# Patient Record
Sex: MALE | Race: WHITE | Employment: FULL TIME | URBAN - METROPOLITAN AREA
[De-identification: names, ages, dates, MRNs, and addresses within clinical notes are randomized per-mention and may not be internally consistent; named-entity substitution may affect disease eponyms.]

---

## 2017-06-01 ENCOUNTER — OFFICE VISIT (OUTPATIENT)
Dept: INTERNAL MEDICINE CLINIC | Age: 37
End: 2017-06-01

## 2017-06-01 VITALS
HEIGHT: 68 IN | BODY MASS INDEX: 22.28 KG/M2 | RESPIRATION RATE: 16 BRPM | SYSTOLIC BLOOD PRESSURE: 137 MMHG | TEMPERATURE: 97.5 F | OXYGEN SATURATION: 98 % | DIASTOLIC BLOOD PRESSURE: 89 MMHG | WEIGHT: 147 LBS | HEART RATE: 58 BPM

## 2017-06-01 DIAGNOSIS — K21.9 GASTROESOPHAGEAL REFLUX DISEASE, ESOPHAGITIS PRESENCE NOT SPECIFIED: ICD-10-CM

## 2017-06-01 DIAGNOSIS — R11.2 NON-INTRACTABLE VOMITING WITH NAUSEA, UNSPECIFIED VOMITING TYPE: Primary | ICD-10-CM

## 2017-06-01 PROCEDURE — 99204 OFFICE O/P NEW MOD 45 MIN: CPT | Performed by: NURSE PRACTITIONER

## 2017-06-01 RX ORDER — OMEPRAZOLE 20 MG/1
20 TABLET, DELAYED RELEASE ORAL DAILY
Qty: 30 TABLET | Refills: 3 | Status: CANCELLED | OUTPATIENT
Start: 2017-06-01

## 2017-06-01 RX ORDER — ONDANSETRON 4 MG/1
4 TABLET, FILM COATED ORAL EVERY 8 HOURS PRN
Qty: 30 TABLET | Refills: 1 | Status: SHIPPED | OUTPATIENT
Start: 2017-06-01 | End: 2017-11-01 | Stop reason: ALTCHOICE

## 2017-06-01 RX ORDER — OMEPRAZOLE 40 MG/1
40 CAPSULE, DELAYED RELEASE ORAL DAILY
Qty: 30 CAPSULE | Refills: 1 | Status: SHIPPED | OUTPATIENT
Start: 2017-06-01 | End: 2017-07-03 | Stop reason: SDUPTHER

## 2017-06-01 ASSESSMENT — ENCOUNTER SYMPTOMS: NAUSEA: 1

## 2017-07-03 ENCOUNTER — OFFICE VISIT (OUTPATIENT)
Dept: INTERNAL MEDICINE CLINIC | Age: 37
End: 2017-07-03

## 2017-07-03 VITALS
WEIGHT: 155 LBS | OXYGEN SATURATION: 99 % | HEART RATE: 69 BPM | DIASTOLIC BLOOD PRESSURE: 82 MMHG | BODY MASS INDEX: 23.49 KG/M2 | RESPIRATION RATE: 18 BRPM | HEIGHT: 68 IN | SYSTOLIC BLOOD PRESSURE: 129 MMHG

## 2017-07-03 DIAGNOSIS — K21.9 GASTROESOPHAGEAL REFLUX DISEASE, ESOPHAGITIS PRESENCE NOT SPECIFIED: ICD-10-CM

## 2017-07-03 DIAGNOSIS — G47.00 INSOMNIA, UNSPECIFIED TYPE: ICD-10-CM

## 2017-07-03 DIAGNOSIS — F41.9 ANXIETY DISORDER, UNSPECIFIED TYPE: Primary | ICD-10-CM

## 2017-07-03 PROCEDURE — 99214 OFFICE O/P EST MOD 30 MIN: CPT | Performed by: NURSE PRACTITIONER

## 2017-07-03 RX ORDER — OMEPRAZOLE 40 MG/1
40 CAPSULE, DELAYED RELEASE ORAL DAILY
Qty: 30 CAPSULE | Refills: 3 | Status: SHIPPED | OUTPATIENT
Start: 2017-07-03 | End: 2017-08-23 | Stop reason: SDUPTHER

## 2017-07-03 RX ORDER — LANOLIN ALCOHOL/MO/W.PET/CERES
3 CREAM (GRAM) TOPICAL NIGHTLY PRN
Qty: 30 TABLET | Refills: 1 | Status: SHIPPED | OUTPATIENT
Start: 2017-07-03 | End: 2018-10-18 | Stop reason: SDUPTHER

## 2017-07-03 RX ORDER — CITALOPRAM 10 MG/1
10 TABLET ORAL DAILY
Qty: 30 TABLET | Refills: 3 | Status: SHIPPED | OUTPATIENT
Start: 2017-07-03 | End: 2017-08-23 | Stop reason: SDUPTHER

## 2017-07-03 ASSESSMENT — PATIENT HEALTH QUESTIONNAIRE - PHQ9
2. FEELING DOWN, DEPRESSED OR HOPELESS: 1
SUM OF ALL RESPONSES TO PHQ QUESTIONS 1-9: 1
SUM OF ALL RESPONSES TO PHQ9 QUESTIONS 1 & 2: 1
1. LITTLE INTEREST OR PLEASURE IN DOING THINGS: 0

## 2017-07-06 PROBLEM — K21.9 GASTROESOPHAGEAL REFLUX DISEASE: Status: ACTIVE | Noted: 2017-07-06

## 2017-07-06 PROBLEM — G47.00 INSOMNIA: Status: ACTIVE | Noted: 2017-07-06

## 2017-07-06 PROBLEM — F41.9 ANXIETY DISORDER: Status: ACTIVE | Noted: 2017-07-06

## 2017-07-06 ASSESSMENT — ENCOUNTER SYMPTOMS
NAUSEA: 1
VOMITING: 0
COUGH: 0
CONSTIPATION: 0
ABDOMINAL PAIN: 0
ABDOMINAL DISTENTION: 0

## 2017-08-23 ENCOUNTER — OFFICE VISIT (OUTPATIENT)
Dept: INTERNAL MEDICINE CLINIC | Age: 37
End: 2017-08-23

## 2017-08-23 VITALS
DIASTOLIC BLOOD PRESSURE: 80 MMHG | OXYGEN SATURATION: 95 % | HEIGHT: 68 IN | BODY MASS INDEX: 22.43 KG/M2 | SYSTOLIC BLOOD PRESSURE: 127 MMHG | RESPIRATION RATE: 20 BRPM | HEART RATE: 83 BPM | WEIGHT: 148 LBS

## 2017-08-23 DIAGNOSIS — K21.9 GASTROESOPHAGEAL REFLUX DISEASE, ESOPHAGITIS PRESENCE NOT SPECIFIED: ICD-10-CM

## 2017-08-23 DIAGNOSIS — F41.9 ANXIETY DISORDER, UNSPECIFIED TYPE: ICD-10-CM

## 2017-08-23 PROCEDURE — 99213 OFFICE O/P EST LOW 20 MIN: CPT | Performed by: NURSE PRACTITIONER

## 2017-08-23 RX ORDER — CITALOPRAM 10 MG/1
10 TABLET ORAL DAILY
Qty: 30 TABLET | Refills: 3 | Status: SHIPPED | OUTPATIENT
Start: 2017-08-23 | End: 2017-09-20 | Stop reason: SDUPTHER

## 2017-08-23 RX ORDER — OMEPRAZOLE 40 MG/1
40 CAPSULE, DELAYED RELEASE ORAL DAILY
Qty: 30 CAPSULE | Refills: 3 | Status: SHIPPED | OUTPATIENT
Start: 2017-08-23 | End: 2019-02-04 | Stop reason: ALTCHOICE

## 2017-08-23 ASSESSMENT — ENCOUNTER SYMPTOMS
ABDOMINAL DISTENTION: 0
NAUSEA: 0
ABDOMINAL PAIN: 0
COUGH: 0

## 2017-09-20 ENCOUNTER — OFFICE VISIT (OUTPATIENT)
Dept: INTERNAL MEDICINE CLINIC | Age: 37
End: 2017-09-20

## 2017-09-20 VITALS
WEIGHT: 154 LBS | RESPIRATION RATE: 20 BRPM | OXYGEN SATURATION: 98 % | BODY MASS INDEX: 23.34 KG/M2 | HEART RATE: 60 BPM | DIASTOLIC BLOOD PRESSURE: 87 MMHG | HEIGHT: 68 IN | SYSTOLIC BLOOD PRESSURE: 137 MMHG

## 2017-09-20 DIAGNOSIS — F41.9 ANXIETY DISORDER, UNSPECIFIED TYPE: Primary | ICD-10-CM

## 2017-09-20 DIAGNOSIS — F32.9 REACTIVE DEPRESSION: ICD-10-CM

## 2017-09-20 PROCEDURE — 99213 OFFICE O/P EST LOW 20 MIN: CPT | Performed by: NURSE PRACTITIONER

## 2017-09-20 RX ORDER — CITALOPRAM 20 MG/1
20 TABLET ORAL DAILY
Qty: 30 TABLET | Refills: 3 | Status: SHIPPED | OUTPATIENT
Start: 2017-09-20 | End: 2018-02-08 | Stop reason: SDUPTHER

## 2017-09-20 RX ORDER — LORAZEPAM 0.5 MG/1
0.5 TABLET ORAL EVERY 8 HOURS PRN
Qty: 15 TABLET | Refills: 0 | Status: SHIPPED | OUTPATIENT
Start: 2017-09-20 | End: 2017-10-20

## 2017-09-20 ASSESSMENT — ENCOUNTER SYMPTOMS
VOMITING: 1
NAUSEA: 1
COUGH: 0

## 2017-11-01 ENCOUNTER — OFFICE VISIT (OUTPATIENT)
Dept: INTERNAL MEDICINE CLINIC | Age: 37
End: 2017-11-01

## 2017-11-01 VITALS
BODY MASS INDEX: 23.42 KG/M2 | HEART RATE: 76 BPM | DIASTOLIC BLOOD PRESSURE: 85 MMHG | RESPIRATION RATE: 16 BRPM | WEIGHT: 154 LBS | SYSTOLIC BLOOD PRESSURE: 135 MMHG

## 2017-11-01 DIAGNOSIS — F41.9 ANXIETY DISORDER, UNSPECIFIED TYPE: Primary | ICD-10-CM

## 2017-11-01 PROCEDURE — 99213 OFFICE O/P EST LOW 20 MIN: CPT | Performed by: NURSE PRACTITIONER

## 2017-11-01 NOTE — PATIENT INSTRUCTIONS
Patient Education        Anxiety Disorder: Care Instructions  Your Care Instructions  Anxiety is a normal reaction to stress. Difficult situations can cause you to have symptoms such as sweaty palms and a nervous feeling. In an anxiety disorder, the symptoms are far more severe. Constant worry, muscle tension, trouble sleeping, nausea and diarrhea, and other symptoms can make normal daily activities difficult or impossible. These symptoms may occur for no reason, and they can affect your work, school, or social life. Medicines, counseling, and self-care can all help. Follow-up care is a key part of your treatment and safety. Be sure to make and go to all appointments, and call your doctor if you are having problems. It's also a good idea to know your test results and keep a list of the medicines you take. How can you care for yourself at home? · Take medicines exactly as directed. Call your doctor if you think you are having a problem with your medicine. · Go to your counseling sessions and follow-up appointments. · Recognize and accept your anxiety. Then, when you are in a situation that makes you anxious, say to yourself, \"This is not an emergency. I feel uncomfortable, but I am not in danger. I can keep going even if I feel anxious. \"  · Be kind to your body:  ¨ Relieve tension with exercise or a massage. ¨ Get enough rest.  ¨ Avoid alcohol, caffeine, nicotine, and illegal drugs. They can increase your anxiety level and cause sleep problems. ¨ Learn and do relaxation techniques. See below for more about these techniques. · Engage your mind. Get out and do something you enjoy. Go to a funny movie, or take a walk or hike. Plan your day. Having too much or too little to do can make you anxious. · Keep a record of your symptoms. Discuss your fears with a good friend or family member, or join a support group for people with similar problems. Talking to others sometimes relieves stress.   · Get involved in play a relaxation tape while you drive a car. When should you call for help? Call 911 anytime you think you may need emergency care. For example, call if:  · You feel you cannot stop from hurting yourself or someone else. Keep the numbers for these national suicide hotlines: 5-614-766-TALK (7-229.525.7497) and 7-993-BPLJORJ (0-832.727.4628). If you or someone you know talks about suicide or feeling hopeless, get help right away. Watch closely for changes in your health, and be sure to contact your doctor if:  · You have anxiety or fear that affects your life. · You have symptoms of anxiety that are new or different from those you had before. Where can you learn more? Go to https://Interactive FatepePolaris Health Directionseb.CoCubes.com. org and sign in to your Solar Site Design account. Enter P754 in the StreetHawk box to learn more about \"Anxiety Disorder: Care Instructions. \"     If you do not have an account, please click on the \"Sign Up Now\" link. Current as of: July 26, 2016  Content Version: 11.3  © 4853-6649 [employer], [employer]orporated. Care instructions adapted under license by [facility] ([facility]). If you have questions about a medical condition or this instruction, always ask your healthcare professional. [address] any warranty or liability for your use of this information.

## 2017-11-02 ASSESSMENT — ENCOUNTER SYMPTOMS: CHOKING: 0

## 2017-11-03 ASSESSMENT — ENCOUNTER SYMPTOMS
VOMITING: 0
ABDOMINAL PAIN: 0
DIARRHEA: 0

## 2018-02-08 DIAGNOSIS — F41.9 ANXIETY DISORDER, UNSPECIFIED TYPE: ICD-10-CM

## 2018-02-08 DIAGNOSIS — F32.9 REACTIVE DEPRESSION: ICD-10-CM

## 2018-02-09 RX ORDER — CITALOPRAM 20 MG/1
20 TABLET ORAL DAILY
Qty: 30 TABLET | Refills: 0 | Status: SHIPPED | OUTPATIENT
Start: 2018-02-09 | End: 2018-02-26 | Stop reason: SDUPTHER

## 2018-02-26 ENCOUNTER — OFFICE VISIT (OUTPATIENT)
Dept: INTERNAL MEDICINE CLINIC | Age: 38
End: 2018-02-26

## 2018-02-26 VITALS
DIASTOLIC BLOOD PRESSURE: 88 MMHG | HEART RATE: 60 BPM | BODY MASS INDEX: 24.71 KG/M2 | WEIGHT: 163 LBS | RESPIRATION RATE: 17 BRPM | SYSTOLIC BLOOD PRESSURE: 134 MMHG | HEIGHT: 68 IN

## 2018-02-26 DIAGNOSIS — F41.9 ANXIETY DISORDER, UNSPECIFIED TYPE: Primary | ICD-10-CM

## 2018-02-26 DIAGNOSIS — F32.9 REACTIVE DEPRESSION: ICD-10-CM

## 2018-02-26 PROCEDURE — 99214 OFFICE O/P EST MOD 30 MIN: CPT | Performed by: NURSE PRACTITIONER

## 2018-02-26 RX ORDER — BUSPIRONE HYDROCHLORIDE 5 MG/1
5 TABLET ORAL 2 TIMES DAILY PRN
Qty: 40 TABLET | Refills: 0 | Status: SHIPPED | OUTPATIENT
Start: 2018-02-26 | End: 2018-07-02 | Stop reason: SDUPTHER

## 2018-02-26 RX ORDER — CITALOPRAM 20 MG/1
20 TABLET ORAL DAILY
Qty: 30 TABLET | Refills: 3 | Status: SHIPPED | OUTPATIENT
Start: 2018-02-26 | End: 2018-07-02 | Stop reason: SDUPTHER

## 2018-02-27 ASSESSMENT — ENCOUNTER SYMPTOMS
NAUSEA: 1
COUGH: 0

## 2018-02-27 NOTE — PROGRESS NOTES
2/27/18    Chief Complaint   Patient presents with    Anxiety    Depression       HPI:   Anthony Onofre is a 42 y.o. male is here today for follow up for anxiety and depression. Patient reports medication seems to be working for his depression however, in the past 2 weeks he has been feeling more anxious. States anxiety is the worst first thing in the morning and usually has nausea. Patient Active Problem List   Diagnosis    Anxiety disorder    Insomnia    Gastroesophageal reflux disease       Past Medical History:   Diagnosis Date    Anxiety        Family History   Problem Relation Age of Onset    Depression Mother        No Known Allergies    Current Outpatient Prescriptions   Medication Sig Dispense Refill    busPIRone (BUSPAR) 5 MG tablet Take 1 tablet by mouth 2 times daily as needed (for anxiety) 40 tablet 0    citalopram (CELEXA) 20 MG tablet Take 1 tablet by mouth daily 30 tablet 3    omeprazole (PRILOSEC) 40 MG delayed release capsule Take 1 capsule by mouth daily 30 capsule 3    melatonin (RA MELATONIN) 3 MG TABS tablet Take 1 tablet by mouth nightly as needed (insomnia) 30 tablet 1     No current facility-administered medications for this visit. Review of Systems   Constitutional: Negative for appetite change. HENT: Negative for congestion. Respiratory: Negative for cough. Gastrointestinal: Positive for nausea (only when feeling anxious). Neurological: Negative for headaches. Vitals:    02/26/18 1602   BP: 134/88   Pulse: 60   Resp: 17   Weight: 163 lb (73.9 kg)   Height: 5' 8\" (1.727 m)       BP Readings from Last 3 Encounters:   02/26/18 134/88   11/01/17 135/85   09/20/17 137/87        Lab Review   No visits with results within 2 Month(s) from this visit.    Latest known visit with results is:   Admission on 07/22/2016, Discharged on 07/22/2016   Component Date Value    WBC 07/22/2016 9.0     RBC 07/22/2016 5.40     Hemoglobin 07/22/2016 15.0     Hematocrit

## 2018-07-02 ENCOUNTER — OFFICE VISIT (OUTPATIENT)
Dept: INTERNAL MEDICINE CLINIC | Age: 38
End: 2018-07-02

## 2018-07-02 VITALS
RESPIRATION RATE: 18 BRPM | SYSTOLIC BLOOD PRESSURE: 138 MMHG | BODY MASS INDEX: 24.1 KG/M2 | WEIGHT: 159 LBS | DIASTOLIC BLOOD PRESSURE: 89 MMHG | HEIGHT: 68 IN | HEART RATE: 63 BPM | OXYGEN SATURATION: 99 %

## 2018-07-02 DIAGNOSIS — Z23 NEED FOR PROPHYLACTIC VACCINATION AGAINST DIPHTHERIA-TETANUS-PERTUSSIS (DTP): ICD-10-CM

## 2018-07-02 DIAGNOSIS — R19.7 DIARRHEA, UNSPECIFIED TYPE: ICD-10-CM

## 2018-07-02 DIAGNOSIS — R11.0 NAUSEATED: ICD-10-CM

## 2018-07-02 DIAGNOSIS — F41.1 GENERALIZED ANXIETY DISORDER: ICD-10-CM

## 2018-07-02 DIAGNOSIS — F32.9 REACTIVE DEPRESSION: Primary | ICD-10-CM

## 2018-07-02 PROCEDURE — 99214 OFFICE O/P EST MOD 30 MIN: CPT | Performed by: NURSE PRACTITIONER

## 2018-07-02 PROCEDURE — 90715 TDAP VACCINE 7 YRS/> IM: CPT | Performed by: NURSE PRACTITIONER

## 2018-07-02 PROCEDURE — 90471 IMMUNIZATION ADMIN: CPT | Performed by: NURSE PRACTITIONER

## 2018-07-02 RX ORDER — BUSPIRONE HYDROCHLORIDE 5 MG/1
5 TABLET ORAL 2 TIMES DAILY PRN
Qty: 60 TABLET | Refills: 1 | Status: SHIPPED | OUTPATIENT
Start: 2018-07-02 | End: 2018-10-02 | Stop reason: SDUPTHER

## 2018-07-02 RX ORDER — CITALOPRAM 20 MG/1
20 TABLET ORAL DAILY
Qty: 30 TABLET | Refills: 3 | Status: SHIPPED | OUTPATIENT
Start: 2018-07-02 | End: 2018-12-31 | Stop reason: SDUPTHER

## 2018-07-02 NOTE — PATIENT INSTRUCTIONS
Patient Education        Learning About Anxiety Disorders  What are anxiety disorders? Anxiety disorders are a type of medical problem. They cause severe anxiety. When you feel anxious, you feel that something bad is about to happen. This feeling interferes with your life. These disorders include:  · Generalized anxiety disorder. You feel worried and stressed about many everyday events and activities. This goes on for several months and disrupts your life on most days. · Panic disorder. You have repeated panic attacks. A panic attack is a sudden, intense fear or anxiety. It may make you feel short of breath. Your heart may pound. · Social anxiety disorder. You feel very anxious about what you will say or do in front of people. For example, you may be scared to talk or eat in public. This problem affects your daily life. · Phobias. You are very scared of a specific object, situation, or activity. For example, you may fear spiders, high places, or small spaces. What are the symptoms? Generalized anxiety disorder  Symptoms may include:  · Feeling worried and stressed about many things almost every day. · Feeling tired or irritable. You may have a hard time concentrating. · Having headaches or muscle aches. · Having a hard time getting to sleep or staying asleep. Panic disorder  You may have repeated panic attacks when there is no reason for feeling afraid. You may change your daily activities because you worry that you will have another attack. Symptoms may include:  · Intense fear, terror, or anxiety. · Trouble breathing or very fast breathing. · Chest pain or tightness. · A heartbeat that races or is not regular. Social anxiety disorder  Symptoms may include:  · Fear about a social situation, such as eating in front of others or speaking in public. You may worry a lot. Or you may be afraid that something bad will happen. · Anxiety that can cause you to blush, sweat, and feel shaky.   · A heartbeat that is faster than normal.  · A hard time focusing. Phobias  Symptoms may include:  · More fear than most people of being around an object, being in a situation, or doing an activity. You might also be stressed about the chance of being around the thing you fear. · Worry about losing control, panicking, fainting, or having physical symptoms like a faster heartbeat when you are around the situation or object. How are these disorders treated? Anxiety disorders can be treated with medicines or counseling. A combination of both may be used. Medicines may include:  · Antidepressants. These may help your symptoms by keeping chemicals in your brain in balance. · Benzodiazepines. These may give you short-term relief of your symptoms. Some people use cognitive-behavioral therapy. A therapist helps you learn to change stressful or bad thoughts into helpful thoughts. Lead a healthy lifestyle  A healthy lifestyle may help you feel better. · Get at least 30 minutes of exercise on most days of the week. Walking is a good choice. · Eat a healthy diet. Include fruits, vegetables, lean proteins, and whole grains in your diet each day. · Try to go to bed at the same time every night. Try for 8 hours of sleep a night. · Find ways to manage stress. Try relaxation exercises. · Avoid alcohol and illegal drugs. Follow-up care is a key part of your treatment and safety. Be sure to make and go to all appointments, and call your doctor if you are having problems. It's also a good idea to know your test results and keep a list of the medicines you take. Where can you learn more? Go to https://elian.OpenHatch. org and sign in to your Atrum [employer] account. Enter W615 in the State [facility] box to learn more about \"Learning About Anxiety Disorders. \"     If you do not have an account, please click on the \"Sign Up Now\" link.   Current as of: December 7, 2017  Content Version: 11.6  © 7095-6863 Healthwise, Incorporated. Care instructions adapted under license by [facility] ([facility]). If you have questions about a medical condition or this instruction, always ask your healthcare professional. [address] any warranty or liability for your use of this information.

## 2018-07-02 NOTE — PROGRESS NOTES
5.40     Hemoglobin 07/22/2016 15.0     Hematocrit 07/22/2016 45.2     MCV 07/22/2016 83.7     MCH 07/22/2016 27.7     MCHC 07/22/2016 33.1     RDW 07/22/2016 13.8     Platelets 29/85/4470 235     MPV 07/22/2016 7.5     Sodium 07/22/2016 141     Potassium 07/22/2016 3.7     Chloride 07/22/2016 100     CO2 07/22/2016 26     Anion Gap 07/22/2016 15     Glucose 07/22/2016 120*    BUN 07/22/2016 12     CREATININE 07/22/2016 0.8*    GFR Non- 07/22/2016 >60     GFR  07/22/2016 >60     Calcium 07/22/2016 9.4     Magnesium 07/22/2016 2.00     Total CK 07/22/2016 149        Physical Exam   HENT:   Mouth/Throat: Oropharynx is clear and moist.   Cardiovascular: Normal rate and regular rhythm. Pulmonary/Chest: Effort normal and breath sounds normal.   Abdominal: There is tenderness in the epigastric area. Musculoskeletal: Normal range of motion. Neurological: He is alert. Psychiatric: His speech is normal and behavior is normal. He exhibits a depressed mood. ASSESSMENT/PLAN:    Anum Jordan was seen today for depression. Diagnoses and all orders for this visit:    Reactive depression  -     citalopram (CELEXA) 20 MG tablet; Take 1 tablet by mouth daily    Generalized anxiety disorder  -     citalopram (CELEXA) 20 MG tablet; Take 1 tablet by mouth daily  -     busPIRone (BUSPAR) 5 MG tablet; Take 1 tablet by mouth 2 times daily as needed (for anxiety)    Need for prophylactic vaccination against diphtheria-tetanus-pertussis (DTP)  -     Tdap (age 10y-63y) IM (Adacel)    Nauseated    Diarrhea, unspecified type  -will refer to GI if continues to have increased problems with nausea and diarrhea. Patient to call if symptoms worsen, verbalized understanding.     RTC in 3 months or sooner if needed

## 2018-07-09 ASSESSMENT — ENCOUNTER SYMPTOMS
COUGH: 0
NAUSEA: 1
DIARRHEA: 1

## 2018-10-02 ENCOUNTER — OFFICE VISIT (OUTPATIENT)
Dept: INTERNAL MEDICINE CLINIC | Age: 38
End: 2018-10-02
Payer: COMMERCIAL

## 2018-10-02 VITALS
OXYGEN SATURATION: 98 % | SYSTOLIC BLOOD PRESSURE: 119 MMHG | RESPIRATION RATE: 20 BRPM | HEART RATE: 73 BPM | HEIGHT: 68 IN | WEIGHT: 167 LBS | DIASTOLIC BLOOD PRESSURE: 78 MMHG | BODY MASS INDEX: 25.31 KG/M2

## 2018-10-02 DIAGNOSIS — Z23 NEED FOR INFLUENZA VACCINATION: ICD-10-CM

## 2018-10-02 DIAGNOSIS — Z13.220 SCREENING FOR HYPERLIPIDEMIA: ICD-10-CM

## 2018-10-02 DIAGNOSIS — Z11.4 SCREENING FOR HIV (HUMAN IMMUNODEFICIENCY VIRUS): ICD-10-CM

## 2018-10-02 DIAGNOSIS — Z13.1 ENCOUNTER FOR SCREENING FOR DIABETES MELLITUS: ICD-10-CM

## 2018-10-02 DIAGNOSIS — F41.1 GENERALIZED ANXIETY DISORDER: Primary | ICD-10-CM

## 2018-10-02 PROCEDURE — 99214 OFFICE O/P EST MOD 30 MIN: CPT | Performed by: NURSE PRACTITIONER

## 2018-10-02 PROCEDURE — 90686 IIV4 VACC NO PRSV 0.5 ML IM: CPT | Performed by: NURSE PRACTITIONER

## 2018-10-02 PROCEDURE — 90471 IMMUNIZATION ADMIN: CPT | Performed by: NURSE PRACTITIONER

## 2018-10-02 RX ORDER — BUSPIRONE HYDROCHLORIDE 5 MG/1
5 TABLET ORAL 2 TIMES DAILY PRN
Qty: 60 TABLET | Refills: 3 | Status: SHIPPED | OUTPATIENT
Start: 2018-10-02 | End: 2019-02-04 | Stop reason: SDUPTHER

## 2018-10-02 NOTE — PROGRESS NOTES
10/10/18    Chief Complaint   Patient presents with    3 Month Follow-Up     Anxiety       HPI:   Roselyn Singh is a 34 y.o. male is here today for anxiety. Patient reports no longer having nausea in the mornings and improved appetite. Patient Active Problem List   Diagnosis    Anxiety disorder    Insomnia    Gastroesophageal reflux disease       Past Medical History:   Diagnosis Date    Anxiety        Family History   Problem Relation Age of Onset    Depression Mother        No Known Allergies    Current Outpatient Prescriptions   Medication Sig Dispense Refill    busPIRone (BUSPAR) 5 MG tablet Take 1 tablet by mouth 2 times daily as needed (for anxiety) 60 tablet 3    citalopram (CELEXA) 20 MG tablet Take 1 tablet by mouth daily 30 tablet 3    omeprazole (PRILOSEC) 40 MG delayed release capsule Take 1 capsule by mouth daily 30 capsule 3    melatonin (RA MELATONIN) 3 MG TABS tablet Take 1 tablet by mouth nightly as needed (insomnia) 30 tablet 1     No current facility-administered medications for this visit. Review of Systems   Psychiatric/Behavioral: Negative for decreased concentration. The patient is not nervous/anxious. All other systems reviewed and are negative. Vitals:    10/02/18 1606   BP: 119/78   Pulse: 73   Resp: 20   SpO2: 98%   Weight: 167 lb (75.8 kg)   Height: 5' 8\" (1.727 m)       BP Readings from Last 3 Encounters:   10/02/18 119/78   07/02/18 138/89   02/26/18 134/88        Lab Review   No visits with results within 2 Month(s) from this visit.    Latest known visit with results is:   Admission on 07/22/2016, Discharged on 07/22/2016   Component Date Value    WBC 07/22/2016 9.0     RBC 07/22/2016 5.40     Hemoglobin 07/22/2016 15.0     Hematocrit 07/22/2016 45.2     MCV 07/22/2016 83.7     MCH 07/22/2016 27.7     MCHC 07/22/2016 33.1     RDW 07/22/2016 13.8     Platelets 21/56/7239 235     MPV 07/22/2016 7.5     Sodium 07/22/2016 141     Potassium

## 2018-12-29 DIAGNOSIS — F32.9 REACTIVE DEPRESSION: ICD-10-CM

## 2018-12-29 DIAGNOSIS — F41.1 GENERALIZED ANXIETY DISORDER: ICD-10-CM

## 2018-12-29 DIAGNOSIS — F41.9 ANXIETY DISORDER, UNSPECIFIED TYPE: ICD-10-CM

## 2018-12-31 RX ORDER — CITALOPRAM 20 MG/1
20 TABLET ORAL DAILY
Qty: 30 TABLET | Refills: 3 | Status: SHIPPED | OUTPATIENT
Start: 2018-12-31 | End: 2019-02-04 | Stop reason: SDUPTHER

## 2018-12-31 RX ORDER — CITALOPRAM 20 MG/1
TABLET ORAL
Qty: 30 TABLET | Refills: 2 | OUTPATIENT
Start: 2018-12-31

## 2019-02-04 ENCOUNTER — OFFICE VISIT (OUTPATIENT)
Dept: INTERNAL MEDICINE CLINIC | Age: 39
End: 2019-02-04
Payer: COMMERCIAL

## 2019-02-04 VITALS
SYSTOLIC BLOOD PRESSURE: 107 MMHG | BODY MASS INDEX: 25.57 KG/M2 | HEART RATE: 67 BPM | OXYGEN SATURATION: 97 % | DIASTOLIC BLOOD PRESSURE: 71 MMHG | WEIGHT: 168.2 LBS | RESPIRATION RATE: 14 BRPM

## 2019-02-04 DIAGNOSIS — G47.00 INSOMNIA, UNSPECIFIED TYPE: ICD-10-CM

## 2019-02-04 DIAGNOSIS — F41.1 GENERALIZED ANXIETY DISORDER: ICD-10-CM

## 2019-02-04 DIAGNOSIS — F32.9 REACTIVE DEPRESSION: ICD-10-CM

## 2019-02-04 PROCEDURE — 99213 OFFICE O/P EST LOW 20 MIN: CPT | Performed by: NURSE PRACTITIONER

## 2019-02-04 RX ORDER — LANOLIN ALCOHOL/MO/W.PET/CERES
CREAM (GRAM) TOPICAL
Qty: 30 TABLET | Refills: 5 | Status: SHIPPED | OUTPATIENT
Start: 2019-02-04 | End: 2020-01-23

## 2019-02-04 RX ORDER — CITALOPRAM 20 MG/1
20 TABLET ORAL DAILY
Qty: 30 TABLET | Refills: 5 | Status: SHIPPED | OUTPATIENT
Start: 2019-02-04 | End: 2020-01-23

## 2019-02-04 RX ORDER — BUSPIRONE HYDROCHLORIDE 5 MG/1
5 TABLET ORAL 2 TIMES DAILY PRN
Qty: 60 TABLET | Refills: 5 | Status: SHIPPED | OUTPATIENT
Start: 2019-02-04 | End: 2022-06-09

## 2019-02-04 ASSESSMENT — ENCOUNTER SYMPTOMS
BACK PAIN: 0
COUGH: 0
SORE THROAT: 0
VOMITING: 0
DIARRHEA: 0
NAUSEA: 0
RHINORRHEA: 0

## 2019-08-06 ENCOUNTER — OFFICE VISIT (OUTPATIENT)
Dept: INTERNAL MEDICINE CLINIC | Age: 39
End: 2019-08-06
Payer: COMMERCIAL

## 2019-08-06 VITALS
SYSTOLIC BLOOD PRESSURE: 145 MMHG | BODY MASS INDEX: 25.24 KG/M2 | WEIGHT: 166 LBS | HEART RATE: 57 BPM | DIASTOLIC BLOOD PRESSURE: 88 MMHG | OXYGEN SATURATION: 97 % | RESPIRATION RATE: 18 BRPM

## 2019-08-06 DIAGNOSIS — G47.00 INSOMNIA, UNSPECIFIED TYPE: ICD-10-CM

## 2019-08-06 DIAGNOSIS — F41.1 GENERALIZED ANXIETY DISORDER: Primary | ICD-10-CM

## 2019-08-06 DIAGNOSIS — K21.9 GASTROESOPHAGEAL REFLUX DISEASE, ESOPHAGITIS PRESENCE NOT SPECIFIED: ICD-10-CM

## 2019-08-06 PROCEDURE — 99214 OFFICE O/P EST MOD 30 MIN: CPT | Performed by: NURSE PRACTITIONER

## 2019-08-06 NOTE — PROGRESS NOTES
[facility]  [address] [place] [address] Drive  [address] [address] [place]   Dept: [phone]       2019     Osorio Ivory (:  1980) is a 37 y.o. male, here for evaluation of the following medical concerns:    Patient is here for f/u for Anxiety, GERD and insomnia. Patient reports his anxiety continues to be under good control, has not needed to take buspar for a few months now. Patient also reports melatonin is working well for his insomnia. GERD  Paitent complains of heartburn. This has been associated with heartburn. He denies abdominal bloating, belching, chest pain, cough, dysphagia and hoarseness. Symptoms have been present for several months. He denies dysphagia. He has not lost weight. He denies melena, hematochezia, hematemesis, and coffee ground emesis. Medical therapy in the past has included proton pump inhibitors. Review of Systems   Constitutional: Negative for activity change and appetite change. HENT: Negative for congestion, rhinorrhea, sneezing and sore throat. Respiratory: Negative for cough. Cardiovascular: Negative for chest pain. Gastrointestinal: Negative for diarrhea, nausea and vomiting. Genitourinary: Negative for dysuria. Musculoskeletal: Negative for back pain. Neurological: Negative for headaches. Prior to Visit Medications    Medication Sig Taking? Authorizing Provider   citalopram (CELEXA) 20 MG tablet Take 1 tablet by mouth daily Yes INES Hill CNP   melatonin 3 MG TABS tablet TAKE ONE TABLET BY MOUTH ONCE NIGHTLY AS NEEDED FOR INSOMNIA Yes INES Kendrick CNP   busPIRone (BUSPAR) 5 MG tablet Take 1 tablet by mouth 2 times daily as needed (for anxiety)  Patient not taking: Reported on 2019  INES Kendrick CNP        Social History     Tobacco Use    Smoking status: Never Smoker    Smokeless tobacco: Never Used   Substance Use Topics    Alcohol use:  No

## 2019-08-06 NOTE — PATIENT INSTRUCTIONS
Patient Education        Learning About Generalized Anxiety Disorder  What is generalized anxiety disorder? We all worry. It's a normal part of life. But when you have generalized anxiety disorder, you worry about lots of things and have a hard time stopping your worry. This worry or anxiety interferes with your relationships, work, and life. What causes it? The cause is not known. But it may be passed down through families. What are the symptoms? You may feel anxious or worry most days about things like work, relationships, health, or money. You may worry about things that are unlikely to happen. You find it hard to stop or control the worry. Because you worry a lot and try hard to stop worrying, you may feel restless, tired, tense, or cranky. You may also find it hard to think or sleep. And you may have headaches or an upset stomach. How is it diagnosed? Your doctor will ask about your health and how often you worry or feel anxious. He or she may ask about other symptoms, like whether you:  · Feel restless. · Feel tired. · Have a hard time thinking or feel that your mind goes blank. · Feel cranky. · Have tense muscles. · Have sleep problems. A physical exam and tests can help make sure that your symptoms aren't caused by a different condition, such as a thyroid problem. How is it treated? Counseling and medicine can both work to treat anxiety. The two are often used along with lifestyle changes. Cognitive-behavioral therapy (CBT) is a type of counseling that's used to help treat anxiety. In CBT, you learn how to notice and replace thoughts that make you feel worried. It also can help you learn how to relax when you worry. Medicines can help. These medicines are often also used for depression. Selective serotonin reuptake inhibitors (SSRIs) are often tried first. But there are other medicines that your doctor may use. You may need to try a few medicines to find one that works well.   Many people feel better by getting regular exercise, eating healthy meals, and getting good sleep. Mindfulness--focusing on things that happen in the present moment--also can help reduce your anxiety. What can you expect when you have it? Having anxiety can be upsetting. Some people might feel less worried and stressed after a couple of months of treatment. But for other people, it might take longer to feel better. Reaching out to people for help is important. Try not to isolate yourself. Let your family and friends help you. Find someone you can trust and confide in. Talk to that person. When you know what anxiety is--and how you can get help for it--you can start to learn new ways of thinking. This can help you cope and work through your anxiety. Follow-up care is a key part of your treatment and safety. Be sure to make and go to all appointments, and call your doctor if you are having problems. It's also a good idea to know your test results and keep a list of the medicines you take. Where can you learn more? Go to https://Fulcrum BioenergypeCapevo.Lenddo. org and sign in to your ThermaSource account. Enter G110 in the Boomerang.com box to learn more about \"Learning About Generalized Anxiety Disorder. \"     If you do not have an account, please click on the \"Sign Up Now\" link. Current as of: September 11, 2018  Content Version: 12.0  © 1370-3997 Healthwise, Incorporated. Care instructions adapted under license by [facility] ([facility]). If you have questions about a medical condition or this instruction, always ask your healthcare professional. [place] [address] any warranty or liability for your use of this information. Patient Education        Insomnia: Care Instructions  Your Care Instructions    Insomnia is the inability to sleep well. It is a common problem for most people at some time. Insomnia may make it hard for you to get to sleep, stay asleep, or sleep as long as you need to.  This can sleepy. Do something relaxing. Repeat your bedtime routine before you go to bed again. · Make your house quiet and calm about an hour before bedtime. Turn down the lights, turn off the TV, log off the computer, and turn down the volume on music. This can help you relax after a busy day. When should you call for help? Watch closely for changes in your health, and be sure to contact your doctor if:    · Your efforts to improve your sleep do not work.     · Your insomnia gets worse.     · You have been feeling down, depressed, or hopeless or have lost interest in things that you usually enjoy. Where can you learn more? Go to https://Hunington PropertiespeMarcadia Biotech.Movinto Fun. org and sign in to your Wedding Spot account. Enter P513 in the [employer] box to learn more about \"Insomnia: Care Instructions. \"     If you do not have an account, please click on the \"Sign Up Now\" link. Current as of: June 28, 2018  Content Version: 12.0  © 1538-7514 Healthwise, Incorporated. Care instructions adapted under license by [facility] ([facility]). If you have questions about a medical condition or this instruction, always ask your healthcare professional. [place] [address] any warranty or liability for your use of this information.

## 2019-08-09 ASSESSMENT — ENCOUNTER SYMPTOMS
SORE THROAT: 0
COUGH: 0
RHINORRHEA: 0
VOMITING: 0
NAUSEA: 0
BACK PAIN: 0
DIARRHEA: 0

## 2020-01-23 ENCOUNTER — OFFICE VISIT (OUTPATIENT)
Dept: INTERNAL MEDICINE CLINIC | Age: 40
End: 2020-01-23
Payer: COMMERCIAL

## 2020-01-23 VITALS
SYSTOLIC BLOOD PRESSURE: 108 MMHG | OXYGEN SATURATION: 98 % | WEIGHT: 160 LBS | DIASTOLIC BLOOD PRESSURE: 65 MMHG | TEMPERATURE: 96.9 F | HEIGHT: 68 IN | RESPIRATION RATE: 14 BRPM | BODY MASS INDEX: 24.25 KG/M2 | HEART RATE: 63 BPM

## 2020-01-23 PROBLEM — M70.50 INFRAPATELLAR BURSITIS: Status: ACTIVE | Noted: 2020-01-23

## 2020-01-23 PROBLEM — F12.20 ADDICTION, MARIJUANA (HCC): Status: ACTIVE | Noted: 2020-01-23

## 2020-01-23 PROCEDURE — 90471 IMMUNIZATION ADMIN: CPT | Performed by: INTERNAL MEDICINE

## 2020-01-23 PROCEDURE — 90686 IIV4 VACC NO PRSV 0.5 ML IM: CPT | Performed by: INTERNAL MEDICINE

## 2020-01-23 PROCEDURE — 99215 OFFICE O/P EST HI 40 MIN: CPT | Performed by: INTERNAL MEDICINE

## 2020-01-23 RX ORDER — CITALOPRAM 20 MG/1
20 TABLET ORAL DAILY
Qty: 90 TABLET | Refills: 3 | Status: SHIPPED | OUTPATIENT
Start: 2020-01-23 | End: 2022-06-09 | Stop reason: SDUPTHER

## 2020-01-23 ASSESSMENT — ENCOUNTER SYMPTOMS
EYE REDNESS: 0
WHEEZING: 0
EYE PAIN: 0
STRIDOR: 0
BACK PAIN: 0
SHORTNESS OF BREATH: 0
ABDOMINAL PAIN: 0
BLOOD IN STOOL: 0
NAUSEA: 0
VOMITING: 0
SORE THROAT: 0
PHOTOPHOBIA: 0
DIARRHEA: 0
COUGH: 0
CONSTIPATION: 0
EYE DISCHARGE: 0

## 2020-01-23 ASSESSMENT — PATIENT HEALTH QUESTIONNAIRE - PHQ9
2. FEELING DOWN, DEPRESSED OR HOPELESS: 0
SUM OF ALL RESPONSES TO PHQ QUESTIONS 1-9: 0
SUM OF ALL RESPONSES TO PHQ QUESTIONS 1-9: 0
SUM OF ALL RESPONSES TO PHQ9 QUESTIONS 1 & 2: 0
1. LITTLE INTEREST OR PLEASURE IN DOING THINGS: 0

## 2020-01-23 NOTE — PROGRESS NOTES
Vaccine Information Sheet, \"Influenza - Inactivated\"  given to Luzma Yang, or parent/legal guardian of  Luzma Yang and verbalized understanding. Patient responses:    Have you ever had a reaction to a flu vaccine? No  Do you have any current illness? No  Have you ever had Guillian Anton Syndrome? No  Do you have a serious allergy to any of the follow: Neomycin, Polymyxin, Thimerosal, eggs or egg products? No    Flu vaccine given per order. Please see immunization tab. Risks and benefits explained. Current VIS given.

## 2020-01-23 NOTE — PATIENT INSTRUCTIONS
Patient Education        Bursitis: Care Instructions  Your Care Instructions    A bursa is a small sac of fluid that helps the tissues around a joint slide over one another easily. Injury or overuse of a joint can cause pain, redness, and inflammation in the bursa (bursitis). Bursitis usually gets better if you avoid the activity that caused it. You can help prevent bursitis from coming back by doing stretching and strengthening exercises. You may also need to change the way you do some activities. Follow-up care is a key part of your treatment and safety. Be sure to make and go to all appointments, and call your doctor if you are having problems. It's also a good idea to know your test results and keep a list of the medicines you take. How can you care for yourself at home? · Put ice or a cold pack on the area for 10 to 20 minutes at a time. Try to do this every 1 to 2 hours for the next 3 days (when you are awake) or until the swelling goes down. Put a thin cloth between the ice and your skin. · After the 3 days of using ice, you may use heat on the area. You can use a hot water bottle; a warm, moist towel; or a heating pad set on low. You can also try alternating heat and ice. · Rest the area where you have pain. Stop any activities that cause pain. Switch to activities that do not stress the area. · Take pain medicines exactly as directed. ? If the doctor gave you a prescription medicine for pain, take it as prescribed. ? If you are not taking a prescription pain medicine, ask your doctor if you can take an over-the-counter medicine. ? Do not take two or more pain medicines at the same time unless the doctor told you to. Many pain medicines have acetaminophen, which is Tylenol. Too much acetaminophen (Tylenol) can be harmful. · To prevent stiffness, gently move the joint as much as you can without pain every day. As the pain gets better, keep doing range-of-motion exercises.  Ask your doctor for exercises that will make the muscles around the joint stronger. Do these as directed. · You can slowly return to the activity that caused the pain, but do it with less effort until you can do it without pain or swelling. Be sure to warm up before and stretch after you do the activity. When should you call for help? Call your doctor now or seek immediate medical care if:    · You have new or worse symptoms of infection, such as:  ? Increased pain, swelling, warmth, or redness. ? Red streaks leading from the area. ? Pus draining from the area. ? A fever.    Watch closely for changes in your health, and be sure to contact your doctor if:    · You do not get better as expected. Where can you learn more? Go to https://Innoz.PhantomAlert.com.. org and sign in to your [employer] account. Enter N568 in the Monitor110 box to learn more about \"Bursitis: Care Instructions. \"     If you do not have an account, please click on the \"Sign Up Now\" link. Current as of: June 26, 2019  Content Version: 12.3  © 6269-5894 Healthwise, Incorporated. Care instructions adapted under license by [facility] ([facility]). If you have questions about a medical condition or this instruction, always ask your healthcare professional. [address] any warranty or liability for your use of this information.

## 2020-01-23 NOTE — PROGRESS NOTES
Chief Complaint   Patient presents with   [address] - No Concerns       HPI: Here to establish new PCP after CHARLENE [facility]gel [facility]ighb[facility] left practice here. REquests med refills. C/o swelling with little pain below left knee. Went to urgent care, where they removed a little fluid for testing, and put him on antibiotics because of some leg redness. That did not really help and the swelling is about the same. He works as a . Continues to smoke marijuana twice daily which is a longstanding habit for him. He does not see that as a problem. I have reviewed the chart notes available from myself and other providers. I have addressed all activeproblems listed below, and created or updated the problems list as needed. I have reviewed and updated the problems list in detail with patient. Patient Active Problem List   Diagnosis    Anxiety disorder    Insomnia    Gastroesophageal reflux disease    Infrapatellar bursitis    Addiction, marijuana ([address]       No problem-specific Assessment & Plan notes found for this encounter.       Discussed all labs, other tests, and imaging if available   Lab Results   Component Value Date    WBC 9.0 07/22/2016    HGB 15.0 07/22/2016    HCT 45.2 07/22/2016    MCV 83.7 07/22/2016     07/22/2016    RBC 5.40 07/22/2016    MCH 27.7 07/22/2016    MCHC 33.1 07/22/2016    RDW 13.8 07/22/2016     Lab Results   Component Value Date     07/22/2016    K 3.7 07/22/2016     07/22/2016    CO2 26 07/22/2016    BUN 12 07/22/2016    CREATININE 0.8 (L) 07/22/2016    GLUCOSE 120 (H) 07/22/2016    CALCIUM 9.4 07/22/2016    LABGLOM >60 07/22/2016    GFRAA >60 07/22/2016     No results found for: TRIG  No results found for: HDL  No results found for: LDLCALC, LDLCHOLESTEROL  No results found for: PSA, PSADIA  No results found for: TSHFT4, TSH  No results found for: LABA1C  No results found for: EAG    I have extensively reviewed and reconciled the medication Not on file     Emotionally abused: Not on file     Physically abused: Not on file     Forced sexual activity: Not on file   Other Topics Concern    Not on file   Social History Narrative    Not on file       Family History   Problem Relation Age of Onset    Depression Mother          Health Maintenance Due   Topic Date Due    HIV screen  05/22/1993    Lipid screen  05/22/2018         /65 (Site: Right Upper Arm, Position: Sitting, Cuff Size: Medium Adult)   Pulse 63   Temp 96.9 °F (36.1 °C) (Oral)   Resp 14   Ht 5' 8\" (1.727 m)   Wt 160 lb (72.6 kg)   SpO2 98%   BMI 24.33 kg/m²   Body mass index is 24.33 kg/m². Physical Exam  Constitutional:       General: He is not in acute distress. Appearance: He is not diaphoretic. HENT:      Head: Normocephalic and atraumatic. Nose: Nose normal.      Mouth/Throat:      Mouth: Mucous membranes are moist.      Pharynx: No oropharyngeal exudate. Eyes:      General: No scleral icterus. Right eye: No discharge. Left eye: No discharge. Conjunctiva/sclera: Conjunctivae normal.      Pupils: Pupils are equal, round, and reactive to light. Neck:      Musculoskeletal: Normal range of motion and neck supple. Thyroid: No thyromegaly. Vascular: No JVD. Trachea: No tracheal deviation. Cardiovascular:      Rate and Rhythm: Normal rate and regular rhythm. Heart sounds: Normal heart sounds. No murmur. No friction rub. No gallop. Pulmonary:      Effort: Pulmonary effort is normal. No respiratory distress. Breath sounds: Normal breath sounds. No stridor. No wheezing. Chest:      Chest wall: No tenderness. Abdominal:      General: Bowel sounds are normal. There is no distension. Palpations: Abdomen is soft. There is no mass. Tenderness: There is no tenderness. There is no guarding or rebound. Musculoskeletal: Normal range of motion. General: No tenderness.       Comments: 5 x 6 cm boggy slightly warm non-erythematous swelling inferior to the left patella; has a port wine stain birthmark that extends over part of the swelling but no other erythema or streaks. Tolerates palpation and full range of motion   Lymphadenopathy:      Cervical: No cervical adenopathy. Skin:     General: Skin is warm and dry. Capillary Refill: Capillary refill takes less than 2 seconds. Coloration: Skin is not pale. Findings: No erythema or rash. Neurological:      Mental Status: He is alert and oriented to person, place, and time. Cranial Nerves: No cranial nerve deficit. Motor: No abnormal muscle tone. Coordination: Coordination normal.      Deep Tendon Reflexes: Reflexes are normal and symmetric. Reflexes normal.   Psychiatric:         Mood and Affect: Mood normal.         Behavior: Behavior normal.         Thought Content: Thought content normal.         Judgment: Judgment normal.         ASSESSMENT AND PLANS:      Except as noted below, all chronic problems have been reviewed and are stable to continue medications or other therapy as previously documented in the patient's chart, with changes per orders or documentation below:        Assessment and Plan: Patient received counseling and, if relevant,printed instructions for all symptoms listed in CC and HPI, as well as for all diagnoses brought onto today's visit note below. Typical counseling includes, but is not limited to, non pharmacologic measures to manage listed symptoms and conditions; appropriate use, risks and benefits for all prescribed medications; potential interactions between medications both prescribed and OTC; diet; exercise; healthy behaviors; and goalsetting to improve health. Pt.or responsible party was involved in shared decision making and had opportunity to have all questions answered. 1. Establishing care with new doctor, encounter for    2.  Generalized anxiety disorder  - citalopram (CELEXA) 20 MG tablet;

## 2020-01-23 NOTE — PROGRESS NOTES
Review of Systems   Constitutional: Negative for chills, fatigue, fever and unexpected weight change. HENT: Negative for congestion, ear discharge, ear pain, hearing loss, nosebleeds and sore throat. Eyes: Negative for photophobia, pain, discharge, redness and visual disturbance. Respiratory: Negative for cough, shortness of breath, wheezing and stridor. Cardiovascular: Negative for chest pain, palpitations and leg swelling. Gastrointestinal: Negative for abdominal pain, blood in stool, constipation, diarrhea, nausea and vomiting. Genitourinary: Negative for dysuria, flank pain, frequency, hematuria and urgency. Musculoskeletal: Positive for joint swelling (knee swollen, has fluid on it). Negative for back pain, myalgias and neck pain. Skin: Negative for rash. Neurological: Negative for dizziness, tremors, seizures, syncope, weakness and headaches. Hematological: Does not bruise/bleed easily. Psychiatric/Behavioral: Negative for hallucinations. The patient is not nervous/anxious.

## 2020-04-11 ENCOUNTER — HOSPITAL ENCOUNTER (EMERGENCY)
Age: 40
Discharge: HOME | End: 2020-04-11
Attending: EMERGENCY MEDICINE
Payer: COMMERCIAL

## 2020-04-11 VITALS
BODY MASS INDEX: 25.16 KG/M2 | WEIGHT: 166.01 LBS | TEMPERATURE: 98.3 F | RESPIRATION RATE: 16 BRPM | DIASTOLIC BLOOD PRESSURE: 85 MMHG | OXYGEN SATURATION: 98 % | HEIGHT: 68 IN | SYSTOLIC BLOOD PRESSURE: 135 MMHG | HEART RATE: 79 BPM

## 2020-04-11 PROCEDURE — 6370000000 HC RX 637 (ALT 250 FOR IP): Performed by: EMERGENCY MEDICINE

## 2020-04-11 PROCEDURE — 99283 EMERGENCY DEPT VISIT LOW MDM: CPT

## 2020-04-11 RX ORDER — TETRACAINE HYDROCHLORIDE 5 MG/ML
1 SOLUTION OPHTHALMIC ONCE
Status: COMPLETED | OUTPATIENT
Start: 2020-04-11 | End: 2020-04-11

## 2020-04-11 RX ORDER — CIPROFLOXACIN HYDROCHLORIDE 3.5 MG/ML
1 SOLUTION/ DROPS TOPICAL ONCE
Status: COMPLETED | OUTPATIENT
Start: 2020-04-11 | End: 2020-04-11

## 2020-04-11 RX ADMIN — TETRACAINE HYDROCHLORIDE 1 DROP: 5 SOLUTION OPHTHALMIC at 10:13

## 2020-04-11 RX ADMIN — CIPROFLOXACIN 1 DROP: 3 SOLUTION OPHTHALMIC at 10:51

## 2020-04-11 ASSESSMENT — ENCOUNTER SYMPTOMS
STRIDOR: 0
ABDOMINAL DISTENTION: 0
CHOKING: 0
COLOR CHANGE: 0
CHEST TIGHTNESS: 0
CONSTIPATION: 0
APNEA: 0
ANAL BLEEDING: 0
BLOOD IN STOOL: 0
WHEEZING: 0
RECTAL PAIN: 0
SHORTNESS OF BREATH: 0
NAUSEA: 0
BACK PAIN: 0
EYE PAIN: 1
VOMITING: 0
PHOTOPHOBIA: 1
COUGH: 0
EYE DISCHARGE: 1
DIARRHEA: 0
ABDOMINAL PAIN: 0
EYE REDNESS: 1

## 2020-04-11 ASSESSMENT — PAIN DESCRIPTION - DESCRIPTORS: DESCRIPTORS: PRESSURE

## 2020-04-11 ASSESSMENT — PAIN - FUNCTIONAL ASSESSMENT
PAIN_FUNCTIONAL_ASSESSMENT: ACTIVITIES ARE NOT PREVENTED
PAIN_FUNCTIONAL_ASSESSMENT: 0-10

## 2020-04-11 ASSESSMENT — PAIN DESCRIPTION - LOCATION: LOCATION: EYE

## 2020-04-11 ASSESSMENT — PAIN DESCRIPTION - FREQUENCY: FREQUENCY: CONTINUOUS

## 2020-04-11 ASSESSMENT — PAIN DESCRIPTION - ONSET: ONSET: GRADUAL

## 2020-04-11 ASSESSMENT — PAIN SCALES - GENERAL
PAINLEVEL_OUTOF10: 2
PAINLEVEL_OUTOF10: 5

## 2020-04-11 ASSESSMENT — PAIN DESCRIPTION - PROGRESSION: CLINICAL_PROGRESSION: GRADUALLY WORSENING

## 2020-04-11 ASSESSMENT — PAIN DESCRIPTION - ORIENTATION: ORIENTATION: LEFT

## 2020-04-11 ASSESSMENT — PAIN DESCRIPTION - PAIN TYPE: TYPE: ACUTE PAIN

## 2020-04-11 NOTE — ED NOTES
Pt's Morgans Lens irrigation dc'd by Daniel Schmitt per EMD order as pt has f/u appointment at [facility] @ 1200 hrs/     Deisi Moeller RN  04/11/20 1100

## 2020-04-11 NOTE — ED PROVIDER NOTES
systems was reviewed and negative. PAST MEDICAL HISTORY     Past Medical History:   Diagnosis Date    Anxiety     Depression        SURGICAL HISTORY     History reviewed. No pertinent surgical history. CURRENT MEDICATIONS       Previous Medications    BUSPIRONE (BUSPAR) 5 MG TABLET    Take 1 tablet by mouth 2 times daily as needed (for anxiety)    CITALOPRAM (CELEXA) 20 MG TABLET    Take 1 tablet by mouth daily       ALLERGIES     Patient has no known allergies.     FAMILY HISTORY        Family History   Problem Relation Age of Onset    Depression Mother        SOCIAL HISTORY       Social History     Socioeconomic History    Marital status: Single     Spouse name: None    Number of children: None    Years of education: None    Highest education level: None   Occupational History    None   Social Needs    Financial resource strain: None    Food insecurity     Worry: None     Inability: None    Transportation needs     Medical: None     Non-medical: None   Tobacco Use    Smoking status: Never Smoker    Smokeless tobacco: Never Used   Substance and Sexual Activity    Alcohol use: No    Drug use: Yes     Types: Marijuana     Comment: occasionally    Sexual activity: Yes     Partners: Female   Lifestyle    Physical activity     Days per week: None     Minutes per session: None    Stress: None   Relationships    Social connections     Talks on phone: None     Gets together: None     Attends Druze service: None     Active member of club or organization: None     Attends meetings of clubs or organizations: None     Relationship status: None    Intimate partner violence     Fear of current or ex partner: None     Emotionally abused: None     Physically abused: None     Forced sexual activity: None   Other Topics Concern    None   Social History Narrative    None       PHYSICAL EXAM       ED Triage Vitals [04/11/20 1006]   BP Temp Temp Source Pulse Resp SpO2 Height Weight   135/85 98.3 °F (36.8

## 2020-04-11 NOTE — ED NOTES
Discharge and education instructions reviewed. Patient verbalized understanding, teach-back successful. Patient denied questions at this time. No acute distress noted. Patient instructed to follow-up as noted - return to emergency department if symptoms worsen. Patient verbalized understanding. Discharged per EDMD with discharge instructions.          Sara Lockett RN  04/11/20 6345

## 2020-05-12 ENCOUNTER — TELEPHONE (OUTPATIENT)
Dept: INTERNAL MEDICINE CLINIC | Age: 40
End: 2020-05-12

## 2020-05-19 ENCOUNTER — VIRTUAL VISIT (OUTPATIENT)
Dept: INTERNAL MEDICINE CLINIC | Age: 40
End: 2020-05-19
Payer: COMMERCIAL

## 2020-05-19 VITALS — BODY MASS INDEX: 24.25 KG/M2 | HEIGHT: 68 IN | WEIGHT: 160 LBS

## 2020-05-19 PROCEDURE — 99213 OFFICE O/P EST LOW 20 MIN: CPT | Performed by: INTERNAL MEDICINE

## 2020-05-19 RX ORDER — OLOPATADINE HYDROCHLORIDE 1 MG/ML
SOLUTION/ DROPS OPHTHALMIC
COMMUNITY
Start: 2020-04-08 | End: 2022-06-09

## 2020-05-19 RX ORDER — ERYTHROMYCIN 5 MG/G
OINTMENT OPHTHALMIC
COMMUNITY
Start: 2020-04-13 | End: 2022-06-09

## 2020-05-19 RX ORDER — FLUTICASONE PROPIONATE 50 MCG
SPRAY, SUSPENSION (ML) NASAL
COMMUNITY
Start: 2020-04-08 | End: 2022-06-09

## 2020-05-19 NOTE — PROGRESS NOTES
changes--likewise for the intermittent visual blurring, he requires optometry follow-up. I advised him that if his is severe or if he loses part of his visual field that would be emergent. Problem List Items Addressed This Visit     None      Visit Diagnoses     Corneal irritation of left eye    -  Primary    Vision changes                Return if symptoms worsen or fail to improve. Nancy Watson is a 37 y.o. male being evaluated by a Virtual Visit (video visit) encounter to address concerns as mentioned above. A caregiver was present when appropriate. Due to this being a TeleHealth encounter ([facility] health [facility]), evaluation of the following organ systems was limited: Vitals/Constitutional/EENT/Resp/CV/GI//MS/Neuro/Skin/Heme-Lymph-Imm. Pursuant to the emergency declaration under the [address] [place] [address], [address] [place] [address] authority and the [address] and [employer] Act, this Virtual Visit was conducted with patient's (and/or legal guardian's) consent, to reduce the patient's risk of exposure to COVID-19 and provide necessary medical care. The patient (and/or legal guardian) has also been advised to contact this office for worsening conditions or problems, and seek emergency medical treatment and/or call 911 if deemed necessary. Patient identification was verified at the start of the visit: Yes    Total time spent for this encounter: Not billed by time    Services were provided through a video synchronous discussion virtually to substitute for in-person clinic visit. Patient and provider were located at their individual homes. --Donato Reveles MD on 5/19/2020 at 2:41 PM    An electronic signature was used to authenticate this note.

## 2022-05-05 ENCOUNTER — OFFICE VISIT (OUTPATIENT)
Dept: INTERNAL MEDICINE CLINIC | Age: 42
End: 2022-05-05
Payer: COMMERCIAL

## 2022-05-05 VITALS
BODY MASS INDEX: 25.76 KG/M2 | HEART RATE: 68 BPM | HEIGHT: 68 IN | SYSTOLIC BLOOD PRESSURE: 137 MMHG | OXYGEN SATURATION: 99 % | DIASTOLIC BLOOD PRESSURE: 88 MMHG | WEIGHT: 170 LBS

## 2022-05-05 DIAGNOSIS — L57.0 ACTINIC KERATOSES: ICD-10-CM

## 2022-05-05 DIAGNOSIS — L81.9 CHANGE IN MULTIPLE PIGMENTED SKIN LESIONS: Primary | ICD-10-CM

## 2022-05-05 PROCEDURE — 99213 OFFICE O/P EST LOW 20 MIN: CPT | Performed by: INTERNAL MEDICINE

## 2022-05-05 ASSESSMENT — PATIENT HEALTH QUESTIONNAIRE - PHQ9
1. LITTLE INTEREST OR PLEASURE IN DOING THINGS: 0
SUM OF ALL RESPONSES TO PHQ9 QUESTIONS 1 & 2: 0
SUM OF ALL RESPONSES TO PHQ QUESTIONS 1-9: 0
2. FEELING DOWN, DEPRESSED OR HOPELESS: 0
SUM OF ALL RESPONSES TO PHQ QUESTIONS 1-9: 0

## 2022-05-05 NOTE — PROGRESS NOTES
Chief Complaint   Patient presents with    Skin Problem     lump on neck and ear       HPI: Complains of a lesion on right posterior neck that he has been aware of for a couple of months and thinks is increasing in size. He states that has itched off and on, which causes him to pick at it. He also notes a similar lesion in the fold behind the right ear that has been present for years and has not been changing. Currently, he does not get much sun exposure, but he admits to sunburns in his youth. Medications reviewed and reconciled with what patient reports to be taking. /88   Pulse 68   Ht 5' 8\" (1.727 m)   Wt 170 lb (77.1 kg)   SpO2 99%   BMI 25.85 kg/m²     Physical Exam well appearing  Heavily sun damaged skin including actinic keratoses on the backs of his hands bilaterally    On the right posterior lateral neck he has a 4 mm cauliflower-like hyperkeratotic papule. There is a similar papule in the right postauricular fold    ASSESSMENT/PLAN: Pt received counseling and, if relevant, printed instructions for all symptoms listed in CC and HPI, as well as for all diagnoses listed below. 1. Change in multiple pigmented skin lesions  - Amb External Referral To Dermatology    2. Actinic keratoses  - Amb External Referral To Dermatology      Problem List Items Addressed This Visit     None      Visit Diagnoses     Change in multiple pigmented skin lesions    -  Primary    Relevant Orders    Amb External Referral To Dermatology    Actinic keratoses        Relevant Orders    Amb External Referral To Dermatology            Return in about 2 weeks (around 5/19/2022) for well adult with FBW.

## 2022-05-05 NOTE — PATIENT INSTRUCTIONS
Patient Education        Actinic Keratosis: Care Instructions  Your Care Instructions  Actinic keratosis is a skin growth caused by sun damage. It can turn into skin cancer, but this isn't common. Actinic keratoses, also called solar keratoses, are small red, brown, or skin-colored scaly patches. They are most common onthe face, neck, hands, and forearms. Your doctor can remove these growths by freezing or scraping them off or byputting medicines on them. Follow-up care is a key part of your treatment and safety. Be sure to make and go to all appointments, and call your doctor if you are having problems. It's also a good idea to know your test results and keep alist of the medicines you take. How can you care for yourself at home?  If your doctor told you how to care for the treated area, follow your doctor's instructions. If you did not get instructions, follow this general advice:  ? Wash around the area with clean water 2 times a day. Don't use hydrogen peroxide or alcohol, which can slow healing. ? You may cover the area with a thin layer of petroleum jelly, such as Vaseline, and a nonstick bandage. ? Apply more petroleum jelly and replace the bandage as needed. To prevent actinic keratosis   Always wear sunscreen on exposed skin. Make sure to use a broad-spectrum sunscreen that has a sun protection factor (SPF) of 30 or higher. Use it every day, even when it is cloudy.  Wear long sleeves, a hat, and pants if you are going to be outdoors for a long time.  Avoid the sun between 10 a.m. and 4 p.m., the peak time for UV rays.  Do not use tanning booths or sunlamps. When should you call for help? Watch closely for changes in your health, and be sure to contact your doctor if:     You have symptoms of infection, such as:  ? Increased pain, swelling, warmth, or redness. ? Red streaks leading from the area. ? Pus draining from the area. ? A fever. Where can you learn more?   Go to https://chpepiceweb.[email or web address] [email or web address] org and sign in to your Vital Connect account. Enter L364 in the [facility] box to learn more about \"Actinic Keratosis: Care Instructions. \"     If you do not have an account, please click on the \"Sign Up Now\" link. Current as of: November 15, 2021               Content Version: 13.2  © 2006-2022 Quolaw. Care instructions adapted under license by [facility] ([facility]). If you have questions about a medical condition or this instruction, always ask your healthcare professional. [place] [address] any warranty or liability for your use of this information. Patient Education        Skin Cancer Prevention: Care Instructions  Your Care Instructions     Skin cancer is the abnormal growth of cells in the skin. It usually appears as a growth that changes in color, shape, or size. This can be a sore that does not heal or a change in a wart or a mole. Skin cancer is almost always curable when found early and treated. So it is important to see your doctor if you haveany of these changes in your skin. Skin cancer is the most common type of cancer. It often appears on areas of the body that have been exposed to the sun, such as the head, face, neck, back,chest, or shoulders. Follow-up care is a key part of your treatment and safety. Be sure to make and go to all appointments, and call your doctor if you are having problems. It's also a good idea to know your test results and keep alist of the medicines you take. How can you care for yourself at home?  Wear a wide-brimmed hat and long sleeves and pants if you are going to be outdoors for a long time.  Avoid the sun between 10 a.m. and 4 p.m., which is the peak time for UV rays.  Wear sunscreen on exposed skin. Make sure to use a broad-spectrum sunscreen that has a sun protection factor (SPF) of 30 or higher. Use it every day, even when it is cloudy.    Do not use tanning booths or sunlamps.  Use lip balm or cream that has sun protection factor (SPF) to protect your lips from getting sunburned.  Wear sunglasses that block UV rays. When should you call for help? Call your doctor now or seek immediate medical care if:     You have signs of infection, such as:  ? Increased pain, swelling, warmth, or redness. ? Red streaks leading from the area. ? Pus draining from the area. ? A fever. Watch closely for changes in your health, and be sure to contact your doctor if:     You see a change in your skin, such as a growth or mole that:  ? Grows bigger. This may happen very slowly. ? Changes color. ? Changes shape. ? Starts to bleed easily.      You have swollen glands in your armpits, groin, or neck.      You do not get better as expected. Where can you learn more? Go to https://O&P Propepiceweb.[email or web address] org and sign in to your [email or web address] account. Enter P392 in the Synthonics box to learn more about \"Skin Cancer Prevention: Care Instructions. \"     If you do not have an account, please click on the \"Sign Up Now\" link. Current as of: September 8, 2021               Content Version: 13.2  © 9349-5729 Feedback-Machine. Care instructions adapted under license by [facility] ([facility]). If you have questions about a medical condition or this instruction, always ask your healthcare professional. [place] [address] any warranty or liability for your use of this information. Patient Education        Learning About Sun Damage and Your Skin  How does the sun affect your skin? Being out in the sun can be good for you. Sunlight can brighten your mood and help you feel healthier. But getting too much of the ultraviolet (UV) rays insunlight or from indoor tanning can harm your skin. The damage may include:   Skin changes like early wrinkling, sagging skin, and age spots.  Skin cancer.   The lighter your skin is, the more easily it can be damaged by the sun. But everyone can benefit from protecting their skin from the sun, regardless ofskin color. The amount of skin damage you can get from sunlight depends on:   The time of day. You are more likely to get a sunburn in the middle of the day. You might think that exposure to the sun isn't a problem on cloudy days, but the sun's UV rays can still pass through clouds.  Whether you are near reflective surfaces, such as water, white sand, concrete, snow, or ice. All of these reflect the sun's UV rays.  The season of the year. The intense sunlight of summer days can cause more skin damage than the sunlight in other seasons.  Altitude. It's easy to get sunburned at higher altitudes. That's because there's less of the earth's atmosphere to block the sunlight. UV exposure increases in elevation.  How close you are to the equator (latitude). The closer you are to the equator, the more direct sunlight passes through the atmosphere. For example, the [facility] gets more sunlight than the [facility].  The UV index of the day, which shows the risk of getting a sunburn that day. How can you protect your skin from the sun? Most sunburn and skin cancer can be prevented. Use the following tips toprotect your skin. Avoid exposure to the sun   Try to spend less time in the sun from 10 in the morning to 4 in the afternoon. Find shade if you need to be outdoors.  Wear clothing that blocks the sun. This can be a wide-brimmed hat that covers your neck, ears, eyes, and scalp. It can also include loose-fitting, tightly-woven clothes that cover your arms and legs.  Wear sunglasses that block UV rays. Use sunscreen   Always wear sunscreen on exposed skin. Make sure to use a broad-spectrum sunscreen that has a sun protection factor (SPF) of 30 or higher. Use it every day, even when it is cloudy.  Apply sunscreen at least 30 minutes before you go out in the sun.  Put on more every 2 to 3 hours while you are in the sun and after you sweat a lot or swim.  Take extra care to protect your skin when you're near water, at higher elevations, or in tropical climates.  Use a broad-spectrum lip balm or cream that has SPF of 30 to protect your lips from getting sunburned. Where can you learn more? Go to https://chpegermania.BugHerd. org and sign in to your Entellium account. Enter R455 in the Kommerstate.ru box to learn more about \"Learning About Sun Damage and Your Skin. \"     If you do not have an account, please click on the \"Sign Up Now\" link. Current as of: November 15, 2021               Content Version: 13.2  © 2006-2022 Healthwise, Incorporated. Care instructions adapted under license by [facility] ([facility]). If you have questions about a medical condition or this instruction, always ask your healthcare professional. [address] any warranty or liability for your use of this information.

## 2022-06-09 ENCOUNTER — OFFICE VISIT (OUTPATIENT)
Dept: INTERNAL MEDICINE CLINIC | Age: 42
End: 2022-06-09
Payer: COMMERCIAL

## 2022-06-09 VITALS
SYSTOLIC BLOOD PRESSURE: 118 MMHG | HEIGHT: 68 IN | WEIGHT: 168 LBS | HEART RATE: 69 BPM | DIASTOLIC BLOOD PRESSURE: 77 MMHG | OXYGEN SATURATION: 97 % | BODY MASS INDEX: 25.46 KG/M2

## 2022-06-09 DIAGNOSIS — Z00.00 WELL ADULT EXAM: ICD-10-CM

## 2022-06-09 DIAGNOSIS — Z00.00 WELL ADULT EXAM: Primary | ICD-10-CM

## 2022-06-09 DIAGNOSIS — F41.1 GENERALIZED ANXIETY DISORDER: ICD-10-CM

## 2022-06-09 LAB
A/G RATIO: 2 (ref 1.1–2.2)
ALBUMIN SERPL-MCNC: 4.7 G/DL (ref 3.4–5)
ALP BLD-CCNC: 63 U/L (ref 40–129)
ALT SERPL-CCNC: 23 U/L (ref 10–40)
ANION GAP SERPL CALCULATED.3IONS-SCNC: 13 MMOL/L (ref 3–16)
AST SERPL-CCNC: 15 U/L (ref 15–37)
BILIRUB SERPL-MCNC: 0.6 MG/DL (ref 0–1)
BUN BLDV-MCNC: 12 MG/DL (ref 7–20)
CALCIUM SERPL-MCNC: 9.6 MG/DL (ref 8.3–10.6)
CHLORIDE BLD-SCNC: 102 MMOL/L (ref 99–110)
CHOLESTEROL, TOTAL: 208 MG/DL (ref 0–199)
CO2: 25 MMOL/L (ref 21–32)
CREAT SERPL-MCNC: 0.8 MG/DL (ref 0.9–1.3)
ESTIMATED AVERAGE GLUCOSE: 114 MG/DL
GFR AFRICAN AMERICAN: >60
GFR NON-AFRICAN AMERICAN: >60
GLUCOSE BLD-MCNC: 108 MG/DL (ref 70–99)
HBA1C MFR BLD: 5.6 %
HDLC SERPL-MCNC: 43 MG/DL (ref 40–60)
HEPATITIS C ANTIBODY INTERPRETATION: NORMAL
LDL CHOLESTEROL CALCULATED: 141 MG/DL
POTASSIUM SERPL-SCNC: 4.4 MMOL/L (ref 3.5–5.1)
SODIUM BLD-SCNC: 140 MMOL/L (ref 136–145)
TOTAL PROTEIN: 7 G/DL (ref 6.4–8.2)
TRIGL SERPL-MCNC: 118 MG/DL (ref 0–150)
VLDLC SERPL CALC-MCNC: 24 MG/DL

## 2022-06-09 PROCEDURE — 99213 OFFICE O/P EST LOW 20 MIN: CPT | Performed by: INTERNAL MEDICINE

## 2022-06-09 PROCEDURE — 99396 PREV VISIT EST AGE 40-64: CPT | Performed by: INTERNAL MEDICINE

## 2022-06-09 RX ORDER — CITALOPRAM 20 MG/1
20 TABLET ORAL DAILY
Qty: 30 TABLET | Refills: 1 | Status: SHIPPED | OUTPATIENT
Start: 2022-06-09 | End: 2022-07-12 | Stop reason: SDUPTHER

## 2022-06-09 NOTE — PATIENT INSTRUCTIONS
Patient Education        Well Visit, Ages 25 to 48: Care Instructions  Overview     Well visits can help you stay healthy. Your doctor has checked your overall health and may have suggested ways to take good care of yourself. Your doctor also may have recommended tests. At home, you can help prevent illness withhealthy eating, regular exercise, and other steps. Follow-up care is a key part of your treatment and safety. Be sure to make and go to all appointments, and call your doctor if you are having problems. It's also a good idea to know your test results and keep alist of the medicines you take. How can you care for yourself at home?  Get screening tests that you and your doctor decide on. Screening helps find diseases before any symptoms appear.  Eat healthy foods. Choose fruits, vegetables, whole grains, protein, and low-fat dairy foods. Limit fat, especially saturated fat. Reduce salt in your diet.  Limit alcohol. If you are a man, have no more than 2 drinks a day or 14 drinks a week. If you are a woman, have no more than 1 drink a day or 7 drinks a week.  Get at least 30 minutes of physical activity on most days of the week. Walking is a good choice. You also may want to do other activities, such as running, swimming, cycling, or playing tennis or team sports. Discuss any changes in your exercise program with your doctor.  Reach and stay at a healthy weight. This will lower your risk for many problems, such as obesity, diabetes, heart disease, and high blood pressure.  Do not smoke or allow others to smoke around you. If you need help quitting, talk to your doctor about stop-smoking programs and medicines. These can increase your chances of quitting for good.  Care for your mental health. It is easy to get weighed down by worry and stress. Learn strategies to manage stress, like deep breathing and mindfulness, and stay connected with your family and community.  If you find you often feel sad or hopeless, talk with your doctor. Treatment can help.  Talk to your doctor about whether you have any risk factors for sexually transmitted infections (STIs). You can help prevent STIs if you wait to have sex with a new partner (or partners) until you've each been tested for STIs. It also helps if you use condoms (male or female condoms) and if you limit your sex partners to one person who only has sex with you. Vaccines are available for some STIs, such as HPV.  Use birth control if it's important to you to prevent pregnancy. Talk with your doctor about the choices available and what might be best for you.  If you think you may have a problem with alcohol or drug use, talk to your doctor. This includes prescription medicines (such as amphetamines and opioids) and illegal drugs (such as cocaine and methamphetamine). Your doctor can help you figure out what type of treatment is best for you.  Protect your skin from too much sun. When you're outdoors from 10 a.m. to 4 p.m., stay in the shade or cover up with clothing and a hat with a wide brim. Wear sunglasses that block UV rays. Even when it's cloudy, put broad-spectrum sunscreen (SPF 30 or higher) on any exposed skin.  See a dentist one or two times a year for checkups and to have your teeth cleaned.  Wear a seat belt in the car. When should you call for help? Watch closely for changes in your health, and be sure to contact your doctor if you have any problems or symptoms that concern you. Where can you learn more? Go to https://QThrujett.healthZinitix. org and sign in to your Wander account. Enter P072 in the [facility] box to learn more about \"Well Visit, Ages 25 to 48: Care Instructions. \"     If you do not have an account, please click on the \"Sign Up Now\" link. Current as of: October 6, 2021               Content Version: 13.2  © 9390-4001 Healthwise, Incorporated. Care instructions adapted under license by [facility] ([facility]).

## 2022-06-09 NOTE — PROGRESS NOTES
2022    Keyon Velázquez (:  1980) is a 38 y.o. male, here for a preventive medicine evaluation. Also wants to resume anxiety  Medication (citalopram) last ordered . States does use marijuana nightly, partially to treat anxiety, doesn't think it is a problem for him. Didn't like buspirone, didn't help. Patient Active Problem List   Diagnosis    Anxiety disorder    Insomnia    Gastroesophageal reflux disease    Infrapatellar bursitis    Addiction, marijuana ([address]       Review of Systems 12 systems reviewed and negative. Works as a , trying to eat better but still a lot of fast foods. Trying to use sunscreen more. Prior to Visit Medications    Medication Sig Taking? Authorizing Provider   citalopram (CELEXA) 20 mg tablet Take 1 tablet by mouth daily Yes Gely Hood MD        No Known Allergies    Past Medical History:   Diagnosis Date    Anxiety     Depression        No past surgical history on file. Social History     Socioeconomic History    Marital status: Single     Spouse name: Not on file    Number of children: Not on file    Years of education: Not on file    Highest education level: Not on file   Occupational History    Not on file   Tobacco Use    Smoking status: Never Smoker    Smokeless tobacco: Never Used   Substance and Sexual Activity    Alcohol use: No    Drug use: Yes     Types: Marijuana Galina Markus)     Comment: occasionally    Sexual activity: Yes     Partners: Female   Other Topics Concern    Not on file   Social History Narrative    Not on file     Social Determinants of Health     Financial Resource Strain:     Difficulty of Paying Living Expenses: Not on file   Food Insecurity:     Worried About Running Out of Food in the Last Year: Not on file    Bhavin of Food in the Last Year: Not on file   Transportation Needs:     Lack of Transportation (Medical): Not on file    Lack of Transportation (Non-Medical):  Not on file   Physical Activity:     Days of Exercise per Week: Not on file    Minutes of Exercise per Session: Not on file   Stress:     Feeling of Stress : Not on file   Social Connections:     Frequency of Communication with Friends and Family: Not on file    Frequency of Social Gatherings with Friends and Family: Not on file    Attends Mormonism Services: Not on file    Active Member of [address] [place] [address] or Organizations: Not on file    Attends Club or Organization Meetings: Not on file    Marital Status: Not on file   Intimate Partner Violence:     Fear of Current or Ex-Partner: Not on file    Emotionally Abused: Not on file    Physically Abused: Not on file    Sexually Abused: Not on file   Housing Stability:     Unable to Pay for Housing in the Last Year: Not on file    Number of [place] in the Last Year: Not on file    Unstable Housing in the Last Year: Not on file        Family History   Problem Relation Age of Onset    Depression Mother        ADVANCE DIRECTIVE: N, <no information>    Vitals:    06/09/22 0757   BP: 118/77   Pulse: 69   SpO2: 97%   Weight: 168 lb (76.2 kg)   Height: 5' 8\" (1.727 m)     Estimated body mass index is 25.54 kg/m² as calculated from the following:    Height as of this encounter: 5' 8\" (1.727 m). Weight as of this encounter: 168 lb (76.2 kg). Physical Exam  Constitutional:       General: He is not in acute distress. Appearance: He is not diaphoretic. HENT:      Head: Normocephalic and atraumatic. Nose: Nose normal.      Mouth/Throat:      Pharynx: No oropharyngeal exudate. Eyes:      General: No scleral icterus. Right eye: No discharge. Left eye: No discharge. Conjunctiva/sclera: Conjunctivae normal.      Pupils: Pupils are equal, round, and reactive to light. Neck:      Thyroid: No thyromegaly. Vascular: No JVD. Trachea: No tracheal deviation. Cardiovascular:      Rate and Rhythm: Normal rate and regular rhythm.       Heart sounds: Normal heart sounds. No murmur heard. No friction rub. No gallop. Pulmonary:      Effort: Pulmonary effort is normal. No respiratory distress. Breath sounds: Normal breath sounds. No stridor. No wheezing. Chest:      Chest wall: No tenderness. Abdominal:      General: Bowel sounds are normal. There is no distension. Palpations: Abdomen is soft. There is no mass. Tenderness: There is no abdominal tenderness. There is no guarding or rebound. Musculoskeletal:         General: No tenderness. Normal range of motion. Cervical back: Normal range of motion and neck supple. Lymphadenopathy:      Cervical: No cervical adenopathy. Skin:     General: Skin is warm and dry. Coloration: Skin is not pale. Findings: No erythema or rash. Neurological:      Mental Status: He is alert and oriented to person, place, and time. Cranial Nerves: No cranial nerve deficit. Motor: No abnormal muscle tone. Coordination: Coordination normal.      Deep Tendon Reflexes: Reflexes are normal and symmetric. Reflexes normal.   Psychiatric:         Mood and Affect: Mood normal.         Behavior: Behavior normal.         Thought Content: Thought content normal.         Judgment: Judgment normal.         No flowsheet data found.     Lab Results   Component Value Date    GLUCOSE 120 07/22/2016       The ASCVD Risk score (Delmy Rodriguez, et al., 2013) failed to calculate for the following reasons:    Cannot find a previous HDL lab    Cannot find a previous total cholesterol lab    Immunization History   Administered Date(s) Administered    Influenza, Quadv, 6 mo and older, IM, PF (Flulaval, Fluarix) 10/02/2018    Influenza, Quadv, IM, PF (6 mo and older Fluzone, Flulaval, Fluarix, and 3 yrs and older Afluria) 01/23/2020    Tdap (Boostrix, Adacel) 07/02/2018       Health Maintenance   Topic Date Due    COVID-19 Vaccine (1) Never done    HIV screen  Never done    Hepatitis C screen  Never done   Rukhsana Bunn Diabetes screen  Never done    Lipids  Never done    Flu vaccine (Season Ended) 09/01/2022    Depression Screen  05/05/2023    DTaP/Tdap/Td vaccine (2 - Td or Tdap) 07/02/2028    Hepatitis A vaccine  Aged Out    Hepatitis B vaccine  Aged Out    Hib vaccine  Aged Out    Meningococcal (ACWY) vaccine  Aged Out    Pneumococcal 0-64 years Vaccine  Aged Out       Assessment & Plan   Well adult exam--counseled on healthy lifestyle, recommend eye checkup  -     Hepatitis C Antibody; Future  -     HIV Screen; Future  -     LIPID PANEL; Future  -     Comprehensive Metabolic Panel; Future  -     Hemoglobin A1C; Future  Generalized anxiety disorder--counseled on resuming medication, limiting marijuana use, non-pharm stress management techniques. Resume citalopram, starting with one half tab for first week. -     citalopram (CELEXA) 20 mg tablet;  Take 1 tablet by mouth daily, Disp-30 tablet, R-1Normal    Return in about 1 month (around 7/9/2022) for med followup VV.         --Lesli Ortiz MD

## 2022-06-10 LAB
HIV AG/AB: NORMAL
HIV ANTIGEN: NORMAL
HIV-1 ANTIBODY: NORMAL
HIV-2 AB: NORMAL

## 2022-07-12 ENCOUNTER — TELEMEDICINE (OUTPATIENT)
Dept: INTERNAL MEDICINE CLINIC | Age: 42
End: 2022-07-12
Payer: COMMERCIAL

## 2022-07-12 DIAGNOSIS — F41.1 GENERALIZED ANXIETY DISORDER: ICD-10-CM

## 2022-07-12 PROCEDURE — 99213 OFFICE O/P EST LOW 20 MIN: CPT | Performed by: INTERNAL MEDICINE

## 2022-07-12 RX ORDER — CITALOPRAM 20 MG/1
30 TABLET ORAL DAILY
Qty: 135 TABLET | Refills: 1 | Status: SHIPPED | OUTPATIENT
Start: 2022-07-12

## 2022-07-12 NOTE — PROGRESS NOTES
No chief complaint on file. HPI: Virtual visit via Voalte during covid-19 pandemic for followup on citalopram start. He is overall happy with the results of the medication states it helped him a lot with calming down; however, he still has a lot of work-related stress including working with another high anxiety person, so is asking about increasing his dose. He denies any side effects. Medications reviewed and reconciled with what patient reports to be taking. There were no vitals taken for this visit. Physical Exam well-appearing and insightful    ASSESSMENT/PLAN: Pt received counseling and, if relevant, printed instructions for all symptoms listed in CC and HPI, as well as for all diagnoses listed below. 1. Generalized anxiety disorder--we will plan to increase his citalopram 20 mg up to 30 mg. Patient will let us know if he has not been doing well on this and needs a follow-up otherwise we can see him back in 6 to 12 months. - citalopram (CELEXA) 20 MG tablet; Take 1.5 tablets by mouth daily  Dispense: 135 tablet; Refill: 1      Problem List Items Addressed This Visit     Anxiety disorder    Relevant Medications    citalopram (CELEXA) 20 MG tablet            No follow-ups on file. Garrison Gomez, was evaluated through a synchronous (real-time) audio-video encounter. The patient (or guardian if applicable) is aware that this is a billable service, which includes applicable co-pays. This Virtual Visit was conducted with patient's (and/or legal guardian's) consent. The visit was conducted pursuant to the emergency declaration under the [facility], [address] [place] [address] authority and the [employer] and [employer] General Act. Patient identification was verified, and a caregiver was present when appropriate. The patient was located at Home: [address] [place] [address].    Provider was located at [facility] ([address] [place] [address]): [address] [facility]  [address],  [address]. Total time spent for this encounter: Not billed by time    --Giovanni Ngo MD on 7/12/2022 at 10:52 AM    An electronic signature was used to authenticate this note.

## 2025-01-28 ENCOUNTER — OFFICE VISIT (OUTPATIENT)
Dept: ORTHOPEDIC SURGERY | Age: 45
End: 2025-01-28
Payer: COMMERCIAL

## 2025-01-28 VITALS — BODY MASS INDEX: 26.68 KG/M2 | HEIGHT: 67 IN | WEIGHT: 170 LBS

## 2025-01-28 DIAGNOSIS — M25.521 RIGHT ELBOW PAIN: Primary | ICD-10-CM

## 2025-01-28 DIAGNOSIS — S56.911A STRAIN OF RIGHT FOREARM, INITIAL ENCOUNTER: ICD-10-CM

## 2025-01-28 DIAGNOSIS — M77.11 RIGHT LATERAL EPICONDYLITIS: ICD-10-CM

## 2025-01-28 PROCEDURE — 99203 OFFICE O/P NEW LOW 30 MIN: CPT | Performed by: FAMILY MEDICINE

## 2025-01-28 PROCEDURE — MISCD86 TENNIS ELBOW STRAP-BREG: Performed by: FAMILY MEDICINE

## 2025-01-28 RX ORDER — PREDNISONE 20 MG/1
TABLET ORAL
Qty: 20 TABLET | Refills: 0 | Status: SHIPPED | OUTPATIENT
Start: 2025-01-28

## 2025-01-28 RX ORDER — DICLOFENAC SODIUM 75 MG/1
75 TABLET, DELAYED RELEASE ORAL 2 TIMES DAILY
Qty: 60 TABLET | Refills: 3 | Status: SHIPPED | OUTPATIENT
Start: 2025-01-28

## 2025-01-28 NOTE — H&P
Chief Complaint    Elbow Injury (NP R ELBOW )      History of Present Illness:  Adams Bradley is a 44 y.o. male presenting with right elbow pain.  He reports he started to have some pain around November of last year which he noticed was worsened by his work as a .  He particularly has issues with using his wrench.  During the  holidays he had some time off of work which help resolved the pain.  In the past 2 weeks the pain has increased and caused significant limits in function including, 7/10 pain when lifting a can of soda.  He went to urgent care last week and received a 6-day course of steroids.  He is currently on day 5 of 6 and reports only a 10 to 15% decrease in his pain.  Denies any inciting event or injury to the elbow.  Has tried IcyHot cream, no anti-inflammatory meds other than the Medrol Dosepak.  Denies neck pain, shooting or tingling sensation to the to the hand.    Medical History:  Patient's medications, allergies, past medical, surgical, social and family histories were reviewed and updated as appropriate.    Review of Systems:  Relevant review of systems reviewed and available in the patient's chart    Vital Signs:  There were no vitals filed for this visit.    General Exam:   Constitutional: Patient is adequately groomed with no evidence of malnutrition  DTRs: Deep tendon reflexes are intact  Mental Status: The patient is oriented to time, place and person.  The patient's mood and affect are appropriate.  Lymphatic: The lymphatic examination bilaterally reveals all areas to be without enlargement or induration.  Vascular: Examination reveals no swelling or calf tenderness.  Peripheral pulses are palpable and 2+.  Neurological: The patient has good coordination.  There is no weakness or sensory deficit.    Elbow Examination:    Inspection: No obvious deformity, injury, ecchymosis or  bruising    Palpation: He is tender palpation over the right brachioradialis muscle and supinator as

## 2025-02-13 ENCOUNTER — OFFICE VISIT (OUTPATIENT)
Dept: ORTHOPEDIC SURGERY | Age: 45
End: 2025-02-13
Payer: COMMERCIAL

## 2025-02-13 VITALS — HEIGHT: 67 IN | BODY MASS INDEX: 26.68 KG/M2 | WEIGHT: 170 LBS

## 2025-02-13 DIAGNOSIS — S56.911A STRAIN OF RIGHT FOREARM, INITIAL ENCOUNTER: ICD-10-CM

## 2025-02-13 DIAGNOSIS — M25.521 RIGHT ELBOW PAIN: Primary | ICD-10-CM

## 2025-02-13 DIAGNOSIS — M77.11 RIGHT LATERAL EPICONDYLITIS: ICD-10-CM

## 2025-02-13 PROCEDURE — 99213 OFFICE O/P EST LOW 20 MIN: CPT | Performed by: FAMILY MEDICINE

## 2025-02-13 RX ORDER — ETODOLAC 400 MG/1
400 TABLET, FILM COATED ORAL 2 TIMES DAILY
Qty: 60 TABLET | Refills: 3 | Status: SHIPPED | OUTPATIENT
Start: 2025-02-13

## 2025-02-13 NOTE — PROGRESS NOTES
the diclofenac due to GI upset.  We did subsequently switch him to Lodine 400 mg 1 pill twice daily as I think would be helpful for him to have a twice a day medicine after coming home from work.  He may continue with his tennis elbow strap with his improvement we will start him on some home-based exercises although if he has recurrence of pain, we will need to consider formal therapy.  I think we can hold off on imaging and injections for the time being and see him back in about 4 to 6 weeks.  He will utilize a strap icing and activity modification was discussed as well as cross friction massage.  He will contact us in the interim with questions or concerns                 Cosigned by: Sarthak Rodriguez MD at 1/28/2025  3:38 PM     Electronically signed by Sarthak Rodriguez MD at 1/28/2025  3:38 PM

## 2025-03-25 ENCOUNTER — OFFICE VISIT (OUTPATIENT)
Dept: ORTHOPEDIC SURGERY | Age: 45
End: 2025-03-25
Payer: COMMERCIAL

## 2025-03-25 VITALS — BODY MASS INDEX: 26.68 KG/M2 | WEIGHT: 170 LBS | HEIGHT: 67 IN

## 2025-03-25 DIAGNOSIS — M25.521 RIGHT ELBOW PAIN: ICD-10-CM

## 2025-03-25 DIAGNOSIS — S46.211A BICEPS STRAIN, RIGHT, INITIAL ENCOUNTER: Primary | ICD-10-CM

## 2025-03-25 DIAGNOSIS — S56.911A STRAIN OF RIGHT FOREARM, INITIAL ENCOUNTER: ICD-10-CM

## 2025-03-25 DIAGNOSIS — M77.11 RIGHT LATERAL EPICONDYLITIS: ICD-10-CM

## 2025-03-25 PROCEDURE — 99213 OFFICE O/P EST LOW 20 MIN: CPT | Performed by: FAMILY MEDICINE

## 2025-03-25 NOTE — PROGRESS NOTES
Signed         Chief Complaint    Elbow Injury (NP R ELBOW )     Fu ongoing right the lateral and anterior elbow pain with difficulty gripping grasping with suspected right epicondylitis/brachioradialis    History of Present Illness:  Adams Bradley is a 44 y.o. male who is a right hand  for [employer] and a patient of Dr. Payton Albarran who is seen today upon self-referral for initial evaluation of ongoing right elbow pain.  He reports he started to have some pain around November of last year which he noticed was worsened by his work as a .  He does not report any specific history of actual injury no activity prior to becoming symptomatic and his pain symptoms were more gradual in onset particularly has issues with using his wrench.  During the  holidays he had some time off of work which help resolved the pain.  Since roughly mid January 2025 the pain has increased and caused significant limits in function including, 7/10 pain when lifting a can of soda.  He went to urgent care last week and received a 6-day course of steroids which were of transient benefit.  He is currently on day 5 of 6 and reports only a 10 to 15% decrease in his pain but believes he may have aggravated this by going back to work last Friday and doing heavy torquing and lifting activities..  Denies any inciting event or injury to the elbow.  Has tried IcyHot cream, no anti-inflammatory meds other than the Medrol Dosepak.  Denies neck pain, shooting or tingling sensation to the to the hand.  He has no previous history of substantial elbow injuries or pain in the past and is being seen today for orthopedic and sports consultation with initial plain film imaging    We initially evaluated Castro in the office on 1/20/2025 diagnosed with likely overuse right lateral epicondylitis with brachioradialis straining.  He presents back today stating he is doing much better.  He is between 85 to 90% improved.  He has been using a